# Patient Record
Sex: MALE | Race: WHITE | NOT HISPANIC OR LATINO | URBAN - METROPOLITAN AREA
[De-identification: names, ages, dates, MRNs, and addresses within clinical notes are randomized per-mention and may not be internally consistent; named-entity substitution may affect disease eponyms.]

---

## 2020-06-17 ENCOUNTER — EMERGENCY (EMERGENCY)
Facility: HOSPITAL | Age: 65
LOS: 1 days | Discharge: HOME | End: 2020-06-17
Attending: EMERGENCY MEDICINE
Payer: MEDICARE

## 2020-06-17 VITALS
DIASTOLIC BLOOD PRESSURE: 75 MMHG | RESPIRATION RATE: 20 BRPM | OXYGEN SATURATION: 99 % | SYSTOLIC BLOOD PRESSURE: 133 MMHG | HEART RATE: 107 BPM | TEMPERATURE: 98 F

## 2020-06-17 VITALS
DIASTOLIC BLOOD PRESSURE: 85 MMHG | HEART RATE: 98 BPM | OXYGEN SATURATION: 100 % | HEIGHT: 69 IN | TEMPERATURE: 98 F | RESPIRATION RATE: 18 BRPM | SYSTOLIC BLOOD PRESSURE: 166 MMHG | WEIGHT: 149.91 LBS

## 2020-06-17 DIAGNOSIS — Z90.49 ACQUIRED ABSENCE OF OTHER SPECIFIED PARTS OF DIGESTIVE TRACT: Chronic | ICD-10-CM

## 2020-06-17 LAB
ALBUMIN SERPL ELPH-MCNC: 3.9 G/DL — SIGNIFICANT CHANGE UP (ref 3.3–5.2)
ALP SERPL-CCNC: 79 U/L — SIGNIFICANT CHANGE UP (ref 40–120)
ALT FLD-CCNC: 20 U/L — SIGNIFICANT CHANGE UP
ANION GAP SERPL CALC-SCNC: 12 MMOL/L — SIGNIFICANT CHANGE UP (ref 5–17)
APTT BLD: 32.7 SEC — SIGNIFICANT CHANGE UP (ref 27.5–36.3)
AST SERPL-CCNC: 54 U/L — HIGH
BASOPHILS # BLD AUTO: 0.11 K/UL — SIGNIFICANT CHANGE UP (ref 0–0.2)
BASOPHILS NFR BLD AUTO: 0.9 % — SIGNIFICANT CHANGE UP (ref 0–2)
BILIRUB SERPL-MCNC: 0.3 MG/DL — LOW (ref 0.4–2)
BUN SERPL-MCNC: 11 MG/DL — SIGNIFICANT CHANGE UP (ref 8–20)
CALCIUM SERPL-MCNC: 10 MG/DL — SIGNIFICANT CHANGE UP (ref 8.6–10.2)
CHLORIDE SERPL-SCNC: 93 MMOL/L — LOW (ref 98–107)
CO2 SERPL-SCNC: 25 MMOL/L — SIGNIFICANT CHANGE UP (ref 22–29)
CREAT SERPL-MCNC: 0.94 MG/DL — SIGNIFICANT CHANGE UP (ref 0.5–1.3)
D DIMER BLD IA.RAPID-MCNC: <150 NG/ML DDU — SIGNIFICANT CHANGE UP
EOSINOPHIL # BLD AUTO: 0.39 K/UL — SIGNIFICANT CHANGE UP (ref 0–0.5)
EOSINOPHIL NFR BLD AUTO: 3.3 % — SIGNIFICANT CHANGE UP (ref 0–6)
GLUCOSE BLDC GLUCOMTR-MCNC: 133 MG/DL — HIGH (ref 70–99)
GLUCOSE BLDC GLUCOMTR-MCNC: 169 MG/DL — HIGH (ref 70–99)
GLUCOSE SERPL-MCNC: 184 MG/DL — HIGH (ref 70–99)
HCT VFR BLD CALC: 41.8 % — SIGNIFICANT CHANGE UP (ref 39–50)
HCT VFR BLD CALC: 44.6 % — SIGNIFICANT CHANGE UP (ref 39–50)
HGB BLD-MCNC: 14.2 G/DL — SIGNIFICANT CHANGE UP (ref 13–17)
HGB BLD-MCNC: 14.9 G/DL — SIGNIFICANT CHANGE UP (ref 13–17)
IMM GRANULOCYTES NFR BLD AUTO: 0.3 % — SIGNIFICANT CHANGE UP (ref 0–1.5)
INR BLD: 0.95 RATIO — SIGNIFICANT CHANGE UP (ref 0.88–1.16)
LYMPHOCYTES # BLD AUTO: 19.6 % — SIGNIFICANT CHANGE UP (ref 13–44)
LYMPHOCYTES # BLD AUTO: 2.32 K/UL — SIGNIFICANT CHANGE UP (ref 1–3.3)
MCHC RBC-ENTMCNC: 28.6 PG — SIGNIFICANT CHANGE UP (ref 27–34)
MCHC RBC-ENTMCNC: 28.7 PG — SIGNIFICANT CHANGE UP (ref 27–34)
MCHC RBC-ENTMCNC: 33.4 GM/DL — SIGNIFICANT CHANGE UP (ref 32–36)
MCHC RBC-ENTMCNC: 34 GM/DL — SIGNIFICANT CHANGE UP (ref 32–36)
MCV RBC AUTO: 84.3 FL — SIGNIFICANT CHANGE UP (ref 80–100)
MCV RBC AUTO: 85.8 FL — SIGNIFICANT CHANGE UP (ref 80–100)
MONOCYTES # BLD AUTO: 0.81 K/UL — SIGNIFICANT CHANGE UP (ref 0–0.9)
MONOCYTES NFR BLD AUTO: 6.9 % — SIGNIFICANT CHANGE UP (ref 2–14)
NEUTROPHILS # BLD AUTO: 8.15 K/UL — HIGH (ref 1.8–7.4)
NEUTROPHILS NFR BLD AUTO: 69 % — SIGNIFICANT CHANGE UP (ref 43–77)
PLATELET # BLD AUTO: 234 K/UL — SIGNIFICANT CHANGE UP (ref 150–400)
PLATELET # BLD AUTO: 253 K/UL — SIGNIFICANT CHANGE UP (ref 150–400)
POTASSIUM SERPL-MCNC: 4.3 MMOL/L — SIGNIFICANT CHANGE UP (ref 3.5–5.3)
POTASSIUM SERPL-SCNC: 4.3 MMOL/L — SIGNIFICANT CHANGE UP (ref 3.5–5.3)
PROT SERPL-MCNC: 7.3 G/DL — SIGNIFICANT CHANGE UP (ref 6.6–8.7)
PROTHROM AB SERPL-ACNC: 10.7 SEC — SIGNIFICANT CHANGE UP (ref 10–12.9)
RBC # BLD: 4.96 M/UL — SIGNIFICANT CHANGE UP (ref 4.2–5.8)
RBC # BLD: 5.2 M/UL — SIGNIFICANT CHANGE UP (ref 4.2–5.8)
RBC # FLD: 12.3 % — SIGNIFICANT CHANGE UP (ref 10.3–14.5)
RBC # FLD: 12.5 % — SIGNIFICANT CHANGE UP (ref 10.3–14.5)
SARS-COV-2 RNA SPEC QL NAA+PROBE: SIGNIFICANT CHANGE UP
SODIUM SERPL-SCNC: 130 MMOL/L — LOW (ref 135–145)
T3 SERPL-MCNC: 97 NG/DL — SIGNIFICANT CHANGE UP (ref 80–200)
T4 AB SER-ACNC: 7.1 UG/DL — SIGNIFICANT CHANGE UP (ref 4.5–12)
TROPONIN T SERPL-MCNC: <0.01 NG/ML — SIGNIFICANT CHANGE UP (ref 0–0.06)
TSH SERPL-MCNC: 2.31 UIU/ML — SIGNIFICANT CHANGE UP (ref 0.27–4.2)
WBC # BLD: 10.36 K/UL — SIGNIFICANT CHANGE UP (ref 3.8–10.5)
WBC # BLD: 11.82 K/UL — HIGH (ref 3.8–10.5)
WBC # FLD AUTO: 10.36 K/UL — SIGNIFICANT CHANGE UP (ref 3.8–10.5)
WBC # FLD AUTO: 11.82 K/UL — HIGH (ref 3.8–10.5)

## 2020-06-17 PROCEDURE — 36415 COLL VENOUS BLD VENIPUNCTURE: CPT

## 2020-06-17 PROCEDURE — 85730 THROMBOPLASTIN TIME PARTIAL: CPT

## 2020-06-17 PROCEDURE — 93880 EXTRACRANIAL BILAT STUDY: CPT | Mod: 26

## 2020-06-17 PROCEDURE — 85610 PROTHROMBIN TIME: CPT

## 2020-06-17 PROCEDURE — U0003: CPT

## 2020-06-17 PROCEDURE — 85379 FIBRIN DEGRADATION QUANT: CPT

## 2020-06-17 PROCEDURE — 80053 COMPREHEN METABOLIC PANEL: CPT

## 2020-06-17 PROCEDURE — G0378: CPT

## 2020-06-17 PROCEDURE — 84443 ASSAY THYROID STIM HORMONE: CPT

## 2020-06-17 PROCEDURE — 93010 ELECTROCARDIOGRAM REPORT: CPT | Mod: 76

## 2020-06-17 PROCEDURE — 71045 X-RAY EXAM CHEST 1 VIEW: CPT

## 2020-06-17 PROCEDURE — 71045 X-RAY EXAM CHEST 1 VIEW: CPT | Mod: 26

## 2020-06-17 PROCEDURE — 82962 GLUCOSE BLOOD TEST: CPT

## 2020-06-17 PROCEDURE — 99234 HOSP IP/OBS SM DT SF/LOW 45: CPT | Mod: CS

## 2020-06-17 PROCEDURE — 84484 ASSAY OF TROPONIN QUANT: CPT

## 2020-06-17 PROCEDURE — 93005 ELECTROCARDIOGRAM TRACING: CPT

## 2020-06-17 PROCEDURE — 93880 EXTRACRANIAL BILAT STUDY: CPT

## 2020-06-17 PROCEDURE — 84480 ASSAY TRIIODOTHYRONINE (T3): CPT

## 2020-06-17 PROCEDURE — 84436 ASSAY OF TOTAL THYROXINE: CPT

## 2020-06-17 PROCEDURE — 85027 COMPLETE CBC AUTOMATED: CPT

## 2020-06-17 PROCEDURE — 99284 EMERGENCY DEPT VISIT MOD MDM: CPT | Mod: 25

## 2020-06-17 RX ORDER — DEXTROSE 50 % IN WATER 50 %
25 SYRINGE (ML) INTRAVENOUS ONCE
Refills: 0 | Status: DISCONTINUED | OUTPATIENT
Start: 2020-06-17 | End: 2020-06-22

## 2020-06-17 RX ORDER — GLUCAGON INJECTION, SOLUTION 0.5 MG/.1ML
1 INJECTION, SOLUTION SUBCUTANEOUS ONCE
Refills: 0 | Status: DISCONTINUED | OUTPATIENT
Start: 2020-06-17 | End: 2020-06-22

## 2020-06-17 RX ORDER — SODIUM CHLORIDE 9 MG/ML
1000 INJECTION, SOLUTION INTRAVENOUS
Refills: 0 | Status: DISCONTINUED | OUTPATIENT
Start: 2020-06-17 | End: 2020-06-22

## 2020-06-17 RX ORDER — DEXTROSE 50 % IN WATER 50 %
15 SYRINGE (ML) INTRAVENOUS ONCE
Refills: 0 | Status: DISCONTINUED | OUTPATIENT
Start: 2020-06-17 | End: 2020-06-22

## 2020-06-17 RX ORDER — ASPIRIN/CALCIUM CARB/MAGNESIUM 324 MG
81 TABLET ORAL DAILY
Refills: 0 | Status: DISCONTINUED | OUTPATIENT
Start: 2020-06-17 | End: 2020-06-22

## 2020-06-17 RX ORDER — DEXTROSE 50 % IN WATER 50 %
12.5 SYRINGE (ML) INTRAVENOUS ONCE
Refills: 0 | Status: DISCONTINUED | OUTPATIENT
Start: 2020-06-17 | End: 2020-06-22

## 2020-06-17 RX ORDER — METOPROLOL TARTRATE 50 MG
100 TABLET ORAL DAILY
Refills: 0 | Status: DISCONTINUED | OUTPATIENT
Start: 2020-06-17 | End: 2020-06-22

## 2020-06-17 RX ORDER — INSULIN LISPRO 100/ML
VIAL (ML) SUBCUTANEOUS
Refills: 0 | Status: DISCONTINUED | OUTPATIENT
Start: 2020-06-17 | End: 2020-06-22

## 2020-06-17 RX ADMIN — Medication 2: at 08:05

## 2020-06-17 RX ADMIN — Medication 100 MILLIGRAM(S): at 08:05

## 2020-06-17 NOTE — ED ADULT NURSE NOTE - CHPI ED NUR SYMPTOMS NEG
no fever/no loss of consciousness/no weakness/no nausea/no numbness/no vomiting/no blurred vision/no change in level of consciousness/no confusion

## 2020-06-17 NOTE — ED PROVIDER NOTE - OBJECTIVE STATEMENT
pt is a 65 y/o male with a pmhx of HTN, DM, hx of SVT s/p ablation 4 years ago, currently following with [facility] presenting to the ed for near syncope. pt states he got up to go to his kitchen when he started feeling diaphoretic and light headed. pt thought his sugar was low, but when he checked it was not. pt denies chest pain palpitations or sob. pt denies recent sick contacts. pt states last stress test one year ago normal, no hx of cardiac stents. pt non smoker. pt denies calf pain or leg swelling, feveres, abd pain, nausea, vomiting, back pain, urinary or fecal incontience, head injury, headache, visual changes

## 2020-06-17 NOTE — ED CDU PROVIDER DISPOSITION NOTE - NSFOLLOWUPINSTRUCTIONS_ED_ALL_ED_FT
Syncope    Syncope is when you temporarily lose consciousness, also called fainting or passing out. It is caused by a sudden decrease in blood flow to the brain. Even though most causes of syncope are not dangerous, syncope can possibly be a sign of a serious medical problem. Signs that you may be about to faint include feeling dizzy, lightheaded, nausea, visual changes, or cold/clammy skin. Do not drive, operate heavy machinery, or play sports until your health care provider says it is okay.    SEEK IMMEDIATE MEDICAL CARE IF YOU HAVE ANY OF THE FOLLOWING SYMPTOMS: severe headache, pain in your chest/abdomen/back, bleeding from your mouth or rectum, palpitations, shortness of breath, pain with breathing, seizure, confusion, or trouble walking.     Please follow up with cardiology within four days  Please follow up with your doctor within 48 hours

## 2020-06-17 NOTE — CONSULT NOTE ADULT - SUBJECTIVE AND OBJECTIVE BOX
[facility]                                          [address], [address], [place] [address]                                               PHONE: [phone]    FAX: [phone] [address], [address], [place] [address]                                       PHONE: [phone]    FAX: [phone]  *******************************************************************************    Reason for Consult/CC: Near syncope    HPI:  ENOC PEARCE is a 64y man with HTN, DMII, HLD, nonobstructive CAD documented from remote cath, paroxsymal SVT s/p ablation, who presents to the ER for evaluation of near syncope. The patient states that he has been feeling well until last night when he states while standing in his kitchen he became diaphoretic and dizzy.  He though it might have been "low blood sugar" but notes that his finger stick at that time was 130.  He denies any associated chest pain, palpitations, shortness of breath or difficulty breathing.  He states that he has been very active lately mowing lawn and doing lots of yard work.  He currently states that he is feeling well and would like to go home.     PAST MEDICAL & SURGICAL HISTORY:  Diabetes  Hypertension  SVT (supraventricular tachycardia): had ablation 2008  S/P cholecystectomy      No Known Allergies      MEDICATIONS  (STANDING):  aspirin  chewable 81 milliGRAM(s) Oral daily  dextrose 5%. 1000 milliLiter(s) (50 mL/Hr) IV Continuous <Continuous>  dextrose 50% Injectable 12.5 Gram(s) IV Push once  dextrose 50% Injectable 25 Gram(s) IV Push once  dextrose 50% Injectable 25 Gram(s) IV Push once  insulin lispro (HumaLOG) corrective regimen sliding scale   SubCutaneous three times a day before meals  metoprolol succinate  milliGRAM(s) Oral daily    MEDICATIONS  (PRN):  dextrose 40% Gel 15 Gram(s) Oral once PRN Blood Glucose LESS THAN 70 milliGRAM(s)/deciliter  glucagon  Injectable 1 milliGRAM(s) IntraMuscular once PRN Glucose LESS THAN 70 milligrams/deciliter      Social History: no active tobacco / EtOH / IVDA    Family History:     ROS: As noted above, otherwise unremarkable.    Vital Signs Last 24 Hrs  T(C): 36.8 (17 Jun 2020 11:58), Max: 36.9 (17 Jun 2020 07:44)  T(F): 98.3 (17 Jun 2020 11:58), Max: 98.4 (17 Jun 2020 07:44)  HR: 107 (17 Jun 2020 11:58) (98 - 114)  BP: 133/75 (17 Jun 2020 11:58) (133/75 - 166/85)  RR: 20 (17 Jun 2020 11:58) (18 - 20)  SpO2: 99% (17 Jun 2020 11:58) (98% - 100%)    PHYSICAL EXAM:  General: Appears well developed, well nourished, no acute distress  HEENT: Head: normocephalic, atraumatic  Eyes: Pupils equal and reactive  Neck: Supple, no carotid bruit, no JVD, no HJR  CARDIOVASCULAR: Normal S1 and S2, no murmur, rub, or gallop  LUNGS: Clear to auscultation bilaterally, no rales, rhonchi or wheeze  ABDOMEN: Soft, nontender, non-distended, positive bowel sounds, no mass or bruit  EXTREMITIES: No edema, distal pulses WNL  SKIN: Warm and dry with normal turgor  NEURO: Alert & oriented x 3, grossly intact  PSYCH: normal mood and affect    LABS:                        14.2   10.36 )-----------( 234      ( 17 Jun 2020 09:37 )             41.8     06-17    130<L>  |  93<L>  |  11.0  ----------------------------<  184<H>  4.3   |  25.0  |  0.94    Ca    10.0      17 Jun 2020 01:42    TPro  7.3  /  Alb  3.9  /  TBili  0.3<L>  /  DBili  x   /  AST  54<H>  /  ALT  20  /  AlkPhos  79  06-17    CARDIAC MARKERS ( 17 Jun 2020 08:16 )  x     / <0.01 ng/mL / x     / x     / x      CARDIAC MARKERS ( 17 Jun 2020 05:22 )  x     / <0.01 ng/mL / x     / x     / x      CARDIAC MARKERS ( 17 Jun 2020 01:42 )  x     / <0.01 ng/mL / x     / x     / x          PT/INR - ( 17 Jun 2020 01:42 )   PT: 10.7 sec;   INR: 0.95 ratio         PTT - ( 17 Jun 2020 01:42 )  PTT:32.7 sec  Thyroid Stimulating Hormone, Serum: 2.31 uIU/mL (06-17 @ 09:37)      RADIOLOGY & ADDITIONAL STUDIES REVIEWED:    Assessment and Plan:  In summary, ENOC PEARCE is a 64y man with HTN, DMII, HLD, nonobstructive CAD documented from remote cath, paroxsymal SVT s/p ablation, admitted for evaluation of near syncope.    - Telemetry reviewed by me: all sinus/sinus tachycardia.   - The patient has been chest pain free since admission without ischemic ECG abnormalities and negative serial troponin and has ruled out for acute MI.   - Echocardiogram from 10/2018 reviewed: normal LV size and systolic function; no hemodynamically significant valvular lesions.   - No evidence of ischemia or CHF clinically, eventual ischemic evaluation (likely as outpatient)  - Rhythm/hemodynamics stable = continue current doses for now and titrate PRN  - Resting tachycardia noted. TSH and D-dimer both wnl.  Prior Holter monitor showing average HR of 95/min.  Continue current dose of metoprolol and encourage adequate hydration.   - Keep K > 4, Mg > 2  - No further inpatient cardiovascular work up indicated.    - Will follow up as needed.  Please do not hesitate to call with any questions or concerns.   - Discussed with ER staff.     Thank you for allowing me to participate in the care of your patient.      Sincerely,    German Batres M.D.

## 2020-06-17 NOTE — ED CDU PROVIDER INITIAL DAY NOTE - ATTENDING CONTRIBUTION TO CARE
65yo male with pmh of SVT s/p ablation, HTN and DM presents with near syncope. PT was going to kitchen felt diaphoretic and lightheaded, no loc. pt at baseline here. followed by [facility], labs wnl, no acute ischemic changes on ekg, will keep in obs for cards/syncope eval

## 2020-06-17 NOTE — ED CDU PROVIDER DISPOSITION NOTE - PATIENT PORTAL LINK FT
You can access the FollowMyHealth Patient Portal offered by [facility] by registering at the following website: [email or web address]. By joining [email or web address]’s FollowMyHealth portal, you will also be able to view your health information using other applications (apps) compatible with our system.

## 2020-06-17 NOTE — ED ADULT NURSE REASSESSMENT NOTE - NS ED NURSE REASSESS COMMENT FT1
Assumed care at 0730, at this time pt A&Ox4.  At this time pt is laying in bed, no complaints of pain or distress at this time.  Medication given as per EMAR.  pt educated on plan of care, pt able to successfully teach back plan of care to RN, RN will continue to reeducate pt during hospital stay.

## 2020-06-17 NOTE — ED CDU PROVIDER DISPOSITION NOTE - CLINICAL COURSE
63 y/o male placed in observation for near- syncope evaluation. serial trop negative. monitored on telemetry without any events. d-dimer < 150. carotid doppler results noted. PT evaluated by cardio and can d/c with outpatient follow up. ED return precautions discussed.

## 2020-06-17 NOTE — ED CDU PROVIDER DISPOSITION NOTE - ATTENDING CONTRIBUTION TO CARE
Pt. awake and alert. Pt. stable for discharge. I, Dr. Mccracken, performed a face to face bedside interview with this patient regarding history of present illness, review of symptoms and relevant past medical, social and family history.  I completed an independent physical examination.  I have also reviewed the ACP's note(s) and discussed the plan with the ACP.

## 2020-06-17 NOTE — ED PROVIDER NOTE - ATTENDING CONTRIBUTION TO CARE
65yo male with pmh of SVT s/p ablation, HTN and DM presents with near syncope. PT was going to kitchen felt diaphoretic and lightheaded, no loc. pt at baseline here. followed by [facility], labs wnl, no acute ischemic changes on ekg, will keep in obs for cards/syncope eval

## 2020-06-17 NOTE — ED ADULT TRIAGE NOTE - CHIEF COMPLAINT QUOTE
Pt comes in complaining of near-syncopal episode, "thought I was going to pass out, I was very sweaty, I thought my blood sugar was low." Pt A+Ox4, denies any chest pain or shortness of breath. Pt moved into critical care for EKG/FS.

## 2020-06-17 NOTE — ED CDU PROVIDER INITIAL DAY NOTE - OBJECTIVE STATEMENT
pt is a 65 y/o male with a pmhx of HTN, DM, hx of SVT s/p ablation 4 years ago, currently following with [facility] presenting to the ed for near syncope. pt states he got up to go to his kitchen when he started feeling diaphoretic and light headed. pt thought his sugar was low, but when he checked it was not. pt denies chest pain palpitations or sob. pt denies recent sick contacts. pt states last stress test one year ago normal, no hx of cardiac stents. pt non smoker. pt denies calf pain or leg swelling, fevers, abd pain, nausea, vomiting, back pain, urinary or fecal incontience, head injury, headache, visual changes

## 2020-06-17 NOTE — ED CDU PROVIDER DISPOSITION NOTE - CARE PROVIDER_API CALL
German Batres)  Cardiovascular Disease; Internal Medicine  [address] [place] [address]  Phone: [phone]  Fax: [phone]  Follow Up Time:

## 2020-06-17 NOTE — ED ADULT NURSE NOTE - NSIMPLEMENTINTERV_GEN_ALL_ED
Implemented All Universal Safety Interventions:  [place] to call system. Call bell, personal items and telephone within reach. Instruct patient to call for assistance. Room bathroom lighting operational. Non-slip footwear when patient is off stretcher. Physically safe environment: no spills, clutter or unnecessary equipment. Stretcher in lowest position, wheels locked, appropriate side rails in place.

## 2020-06-17 NOTE — ED PROVIDER NOTE - NEUROLOGICAL, MLM
Alert and oriented, CN II-XII intact, neurovasculary intact, muscle strength fair, gait without ataxia

## 2022-11-17 ENCOUNTER — EMERGENCY (EMERGENCY)
Facility: HOSPITAL | Age: 67
LOS: 1 days | Discharge: HOME | End: 2022-11-17
Attending: EMERGENCY MEDICINE
Payer: MEDICARE

## 2022-11-17 VITALS
RESPIRATION RATE: 16 BRPM | SYSTOLIC BLOOD PRESSURE: 157 MMHG | OXYGEN SATURATION: 100 % | TEMPERATURE: 98 F | DIASTOLIC BLOOD PRESSURE: 84 MMHG | HEART RATE: 92 BPM | HEIGHT: 69 IN | WEIGHT: 147.93 LBS

## 2022-11-17 DIAGNOSIS — Z90.49 ACQUIRED ABSENCE OF OTHER SPECIFIED PARTS OF DIGESTIVE TRACT: Chronic | ICD-10-CM

## 2022-11-17 LAB
ALBUMIN SERPL ELPH-MCNC: 3.7 G/DL — SIGNIFICANT CHANGE UP (ref 3.3–5.2)
ALP SERPL-CCNC: 77 U/L — SIGNIFICANT CHANGE UP (ref 40–120)
ALT FLD-CCNC: 18 U/L — SIGNIFICANT CHANGE UP
ANION GAP SERPL CALC-SCNC: 8 MMOL/L — SIGNIFICANT CHANGE UP (ref 5–17)
APTT BLD: 28.8 SEC — SIGNIFICANT CHANGE UP (ref 27.5–35.5)
AST SERPL-CCNC: 52 U/L — HIGH
BILIRUB SERPL-MCNC: 0.9 MG/DL — SIGNIFICANT CHANGE UP (ref 0.4–2)
BUN SERPL-MCNC: 11.2 MG/DL — SIGNIFICANT CHANGE UP (ref 8–20)
CALCIUM SERPL-MCNC: 9.5 MG/DL — SIGNIFICANT CHANGE UP (ref 8.4–10.5)
CHLORIDE SERPL-SCNC: 92 MMOL/L — LOW (ref 96–108)
CO2 SERPL-SCNC: 25 MMOL/L — SIGNIFICANT CHANGE UP (ref 22–29)
CREAT SERPL-MCNC: 0.89 MG/DL — SIGNIFICANT CHANGE UP (ref 0.5–1.3)
EGFR: 93 ML/MIN/1.73M2 — SIGNIFICANT CHANGE UP
GLUCOSE SERPL-MCNC: 220 MG/DL — HIGH (ref 70–99)
HCT VFR BLD CALC: 41.4 % — SIGNIFICANT CHANGE UP (ref 39–50)
HGB BLD-MCNC: 14.5 G/DL — SIGNIFICANT CHANGE UP (ref 13–17)
INR BLD: 1.07 RATIO — SIGNIFICANT CHANGE UP (ref 0.88–1.16)
MCHC RBC-ENTMCNC: 29.7 PG — SIGNIFICANT CHANGE UP (ref 27–34)
MCHC RBC-ENTMCNC: 35 GM/DL — SIGNIFICANT CHANGE UP (ref 32–36)
MCV RBC AUTO: 84.8 FL — SIGNIFICANT CHANGE UP (ref 80–100)
PLATELET # BLD AUTO: 164 K/UL — SIGNIFICANT CHANGE UP (ref 150–400)
POTASSIUM SERPL-MCNC: 4.7 MMOL/L — SIGNIFICANT CHANGE UP (ref 3.5–5.3)
POTASSIUM SERPL-SCNC: 4.7 MMOL/L — SIGNIFICANT CHANGE UP (ref 3.5–5.3)
PROT SERPL-MCNC: 6.3 G/DL — LOW (ref 6.6–8.7)
PROTHROM AB SERPL-ACNC: 12.4 SEC — SIGNIFICANT CHANGE UP (ref 10.5–13.4)
RBC # BLD: 4.88 M/UL — SIGNIFICANT CHANGE UP (ref 4.2–5.8)
RBC # FLD: 12.8 % — SIGNIFICANT CHANGE UP (ref 10.3–14.5)
SODIUM SERPL-SCNC: 125 MMOL/L — LOW (ref 135–145)
TROPONIN T SERPL-MCNC: <0.01 NG/ML — SIGNIFICANT CHANGE UP (ref 0–0.06)
WBC # BLD: 8.49 K/UL — SIGNIFICANT CHANGE UP (ref 3.8–10.5)
WBC # FLD AUTO: 8.49 K/UL — SIGNIFICANT CHANGE UP (ref 3.8–10.5)

## 2022-11-17 PROCEDURE — 93010 ELECTROCARDIOGRAM REPORT: CPT

## 2022-11-17 PROCEDURE — 99285 EMERGENCY DEPT VISIT HI MDM: CPT

## 2022-11-17 RX ORDER — SODIUM CHLORIDE 9 MG/ML
1000 INJECTION INTRAMUSCULAR; INTRAVENOUS; SUBCUTANEOUS ONCE
Refills: 0 | Status: COMPLETED | OUTPATIENT
Start: 2022-11-17 | End: 2022-11-17

## 2022-11-17 NOTE — ED ADULT TRIAGE NOTE - CHIEF COMPLAINT QUOTE
Pt A&Ox4 states "I passed out going into bed from the bathroom." BIBA c/o Syncopal episode after BM, felt dizzy and sweaty. Patient has colon cancer completed 1st infusion of chemo today, denies any pain or injury.

## 2022-11-18 LAB
ANION GAP SERPL CALC-SCNC: 12 MMOL/L — SIGNIFICANT CHANGE UP (ref 5–17)
ANION GAP SERPL CALC-SCNC: 9 MMOL/L — SIGNIFICANT CHANGE UP (ref 5–17)
ANION GAP SERPL CALC-SCNC: 9 MMOL/L — SIGNIFICANT CHANGE UP (ref 5–17)
APPEARANCE UR: CLEAR — SIGNIFICANT CHANGE UP
BILIRUB UR-MCNC: NEGATIVE — SIGNIFICANT CHANGE UP
BUN SERPL-MCNC: 10 MG/DL — SIGNIFICANT CHANGE UP (ref 8–20)
BUN SERPL-MCNC: 8.7 MG/DL — SIGNIFICANT CHANGE UP (ref 8–20)
BUN SERPL-MCNC: 9 MG/DL — SIGNIFICANT CHANGE UP (ref 8–20)
CALCIUM SERPL-MCNC: 8.5 MG/DL — SIGNIFICANT CHANGE UP (ref 8.4–10.5)
CALCIUM SERPL-MCNC: 8.7 MG/DL — SIGNIFICANT CHANGE UP (ref 8.4–10.5)
CALCIUM SERPL-MCNC: 8.8 MG/DL — SIGNIFICANT CHANGE UP (ref 8.4–10.5)
CHLORIDE SERPL-SCNC: 92 MMOL/L — LOW (ref 96–108)
CHLORIDE SERPL-SCNC: 96 MMOL/L — SIGNIFICANT CHANGE UP (ref 96–108)
CHLORIDE SERPL-SCNC: 97 MMOL/L — SIGNIFICANT CHANGE UP (ref 96–108)
CO2 SERPL-SCNC: 22 MMOL/L — SIGNIFICANT CHANGE UP (ref 22–29)
CO2 SERPL-SCNC: 23 MMOL/L — SIGNIFICANT CHANGE UP (ref 22–29)
CO2 SERPL-SCNC: 23 MMOL/L — SIGNIFICANT CHANGE UP (ref 22–29)
COLOR SPEC: SIGNIFICANT CHANGE UP
CREAT SERPL-MCNC: 0.66 MG/DL — SIGNIFICANT CHANGE UP (ref 0.5–1.3)
CREAT SERPL-MCNC: 0.68 MG/DL — SIGNIFICANT CHANGE UP (ref 0.5–1.3)
CREAT SERPL-MCNC: 0.74 MG/DL — SIGNIFICANT CHANGE UP (ref 0.5–1.3)
DIFF PNL FLD: NEGATIVE — SIGNIFICANT CHANGE UP
EGFR: 101 ML/MIN/1.73M2 — SIGNIFICANT CHANGE UP
EGFR: 102 ML/MIN/1.73M2 — SIGNIFICANT CHANGE UP
EGFR: 98 ML/MIN/1.73M2 — SIGNIFICANT CHANGE UP
FLUAV AG NPH QL: SIGNIFICANT CHANGE UP
FLUBV AG NPH QL: SIGNIFICANT CHANGE UP
GLUCOSE BLDC GLUCOMTR-MCNC: 142 MG/DL — HIGH (ref 70–99)
GLUCOSE BLDC GLUCOMTR-MCNC: 161 MG/DL — HIGH (ref 70–99)
GLUCOSE BLDC GLUCOMTR-MCNC: 169 MG/DL — HIGH (ref 70–99)
GLUCOSE BLDC GLUCOMTR-MCNC: 191 MG/DL — HIGH (ref 70–99)
GLUCOSE SERPL-MCNC: 195 MG/DL — HIGH (ref 70–99)
GLUCOSE SERPL-MCNC: 197 MG/DL — HIGH (ref 70–99)
GLUCOSE SERPL-MCNC: 199 MG/DL — HIGH (ref 70–99)
GLUCOSE UR QL: 250 MG/DL
HCT VFR BLD CALC: 40.3 % — SIGNIFICANT CHANGE UP (ref 39–50)
HGB BLD-MCNC: 13.9 G/DL — SIGNIFICANT CHANGE UP (ref 13–17)
KETONES UR-MCNC: ABNORMAL
LEUKOCYTE ESTERASE UR-ACNC: NEGATIVE — SIGNIFICANT CHANGE UP
MCHC RBC-ENTMCNC: 29.1 PG — SIGNIFICANT CHANGE UP (ref 27–34)
MCHC RBC-ENTMCNC: 34.5 GM/DL — SIGNIFICANT CHANGE UP (ref 32–36)
MCV RBC AUTO: 84.5 FL — SIGNIFICANT CHANGE UP (ref 80–100)
NITRITE UR-MCNC: NEGATIVE — SIGNIFICANT CHANGE UP
PH UR: 8 — SIGNIFICANT CHANGE UP (ref 5–8)
PLATELET # BLD AUTO: 149 K/UL — LOW (ref 150–400)
POTASSIUM SERPL-MCNC: 3.9 MMOL/L — SIGNIFICANT CHANGE UP (ref 3.5–5.3)
POTASSIUM SERPL-MCNC: 3.9 MMOL/L — SIGNIFICANT CHANGE UP (ref 3.5–5.3)
POTASSIUM SERPL-MCNC: 4.1 MMOL/L — SIGNIFICANT CHANGE UP (ref 3.5–5.3)
POTASSIUM SERPL-SCNC: 3.9 MMOL/L — SIGNIFICANT CHANGE UP (ref 3.5–5.3)
POTASSIUM SERPL-SCNC: 3.9 MMOL/L — SIGNIFICANT CHANGE UP (ref 3.5–5.3)
POTASSIUM SERPL-SCNC: 4.1 MMOL/L — SIGNIFICANT CHANGE UP (ref 3.5–5.3)
PROT UR-MCNC: NEGATIVE — SIGNIFICANT CHANGE UP
RBC # BLD: 4.77 M/UL — SIGNIFICANT CHANGE UP (ref 4.2–5.8)
RBC # FLD: 12.5 % — SIGNIFICANT CHANGE UP (ref 10.3–14.5)
RSV RNA NPH QL NAA+NON-PROBE: SIGNIFICANT CHANGE UP
SARS-COV-2 RNA SPEC QL NAA+PROBE: SIGNIFICANT CHANGE UP
SODIUM SERPL-SCNC: 127 MMOL/L — LOW (ref 135–145)
SODIUM SERPL-SCNC: 127 MMOL/L — LOW (ref 135–145)
SODIUM SERPL-SCNC: 128 MMOL/L — LOW (ref 135–145)
SP GR SPEC: 1.01 — SIGNIFICANT CHANGE UP (ref 1.01–1.02)
TROPONIN T SERPL-MCNC: <0.01 NG/ML — SIGNIFICANT CHANGE UP (ref 0–0.06)
UROBILINOGEN FLD QL: NEGATIVE MG/DL — SIGNIFICANT CHANGE UP
WBC # BLD: 9.89 K/UL — SIGNIFICANT CHANGE UP (ref 3.8–10.5)
WBC # FLD AUTO: 9.89 K/UL — SIGNIFICANT CHANGE UP (ref 3.8–10.5)

## 2022-11-18 PROCEDURE — 70450 CT HEAD/BRAIN W/O DYE: CPT | Mod: 26,MA

## 2022-11-18 PROCEDURE — 99220: CPT

## 2022-11-18 PROCEDURE — 93010 ELECTROCARDIOGRAM REPORT: CPT

## 2022-11-18 PROCEDURE — 71045 X-RAY EXAM CHEST 1 VIEW: CPT | Mod: 26

## 2022-11-18 RX ORDER — DEXTROSE 50 % IN WATER 50 %
15 SYRINGE (ML) INTRAVENOUS ONCE
Refills: 0 | Status: DISCONTINUED | OUTPATIENT
Start: 2022-11-18 | End: 2022-11-25

## 2022-11-18 RX ORDER — SODIUM CHLORIDE 9 MG/ML
1000 INJECTION INTRAMUSCULAR; INTRAVENOUS; SUBCUTANEOUS ONCE
Refills: 0 | Status: COMPLETED | OUTPATIENT
Start: 2022-11-18 | End: 2022-11-18

## 2022-11-18 RX ORDER — ACETAMINOPHEN 500 MG
1000 TABLET ORAL ONCE
Refills: 0 | Status: COMPLETED | OUTPATIENT
Start: 2022-11-18 | End: 2022-11-18

## 2022-11-18 RX ORDER — SODIUM CHLORIDE 9 MG/ML
1000 INJECTION INTRAMUSCULAR; INTRAVENOUS; SUBCUTANEOUS
Refills: 0 | Status: DISCONTINUED | OUTPATIENT
Start: 2022-11-18 | End: 2022-11-18

## 2022-11-18 RX ORDER — GLUCAGON INJECTION, SOLUTION 0.5 MG/.1ML
1 INJECTION, SOLUTION SUBCUTANEOUS ONCE
Refills: 0 | Status: DISCONTINUED | OUTPATIENT
Start: 2022-11-18 | End: 2022-11-25

## 2022-11-18 RX ORDER — INSULIN LISPRO 100/ML
VIAL (ML) SUBCUTANEOUS
Refills: 0 | Status: DISCONTINUED | OUTPATIENT
Start: 2022-11-18 | End: 2022-11-25

## 2022-11-18 RX ORDER — METOPROLOL TARTRATE 50 MG
100 TABLET ORAL DAILY
Refills: 0 | Status: DISCONTINUED | OUTPATIENT
Start: 2022-11-18 | End: 2022-11-25

## 2022-11-18 RX ORDER — LANOLIN ALCOHOL/MO/W.PET/CERES
3 CREAM (GRAM) TOPICAL ONCE
Refills: 0 | Status: COMPLETED | OUTPATIENT
Start: 2022-11-18 | End: 2022-11-18

## 2022-11-18 RX ORDER — SODIUM CHLORIDE 9 MG/ML
1000 INJECTION, SOLUTION INTRAVENOUS
Refills: 0 | Status: DISCONTINUED | OUTPATIENT
Start: 2022-11-18 | End: 2022-11-25

## 2022-11-18 RX ORDER — DEXTROSE 50 % IN WATER 50 %
25 SYRINGE (ML) INTRAVENOUS ONCE
Refills: 0 | Status: DISCONTINUED | OUTPATIENT
Start: 2022-11-18 | End: 2022-11-25

## 2022-11-18 RX ORDER — DEXTROSE 50 % IN WATER 50 %
12.5 SYRINGE (ML) INTRAVENOUS ONCE
Refills: 0 | Status: DISCONTINUED | OUTPATIENT
Start: 2022-11-18 | End: 2022-11-25

## 2022-11-18 RX ORDER — ACETAMINOPHEN 500 MG
650 TABLET ORAL EVERY 6 HOURS
Refills: 0 | Status: DISCONTINUED | OUTPATIENT
Start: 2022-11-18 | End: 2022-11-25

## 2022-11-18 RX ADMIN — Medication 2: at 08:33

## 2022-11-18 RX ADMIN — SODIUM CHLORIDE 1000 MILLILITER(S): 9 INJECTION INTRAMUSCULAR; INTRAVENOUS; SUBCUTANEOUS at 00:13

## 2022-11-18 RX ADMIN — SODIUM CHLORIDE 1000 MILLILITER(S): 9 INJECTION INTRAMUSCULAR; INTRAVENOUS; SUBCUTANEOUS at 19:07

## 2022-11-18 RX ADMIN — Medication 2: at 17:39

## 2022-11-18 RX ADMIN — SODIUM CHLORIDE 125 MILLILITER(S): 9 INJECTION INTRAMUSCULAR; INTRAVENOUS; SUBCUTANEOUS at 14:43

## 2022-11-18 RX ADMIN — SODIUM CHLORIDE 1000 MILLILITER(S): 9 INJECTION INTRAMUSCULAR; INTRAVENOUS; SUBCUTANEOUS at 03:31

## 2022-11-18 RX ADMIN — Medication 100 MILLIGRAM(S): at 06:44

## 2022-11-18 NOTE — ED CDU PROVIDER INITIAL DAY NOTE - PROGRESS NOTE DETAILS
syncopized secondary time while attempting orthostatic BPs   came to. no head injury.   will repeat am labs and ekg

## 2022-11-18 NOTE — ED CDU PROVIDER INITIAL DAY NOTE - OBJECTIVE STATEMENT
67-year-old male history of CAD hypertension IDDM recently diagnosed with colon cancer and just started chemotherapy with [facility] yesterday.  Presenting to the ED with episode of syncope.  Reports that he had a loose bowel movement, no melena no bRPR after receiving chemo. was getting up from the toilet felt " woozy" , proceeded to walk to his bedroom where he felt like "I was going down".  Positive head injury, no ac.  Patient reports mild diffuse abdominal discomfort, no associated nausea, vomiting, fevers.  Reports prior history of syncopal episodes.  Follows with [facility], no recent outpatient appointments.  Denied presyncopal symptoms, no chest pain shortness of breath, headache, numbness, tingling, visual changes.  + family hx of DM

## 2022-11-18 NOTE — ED PROVIDER NOTE - OBJECTIVE STATEMENT
68 yo male pmh dm , htn, rectal ca on chemotherapy comes to ed with syncopal episode after having a loose bowel movement; pt denies any head or neck pain

## 2022-11-18 NOTE — ED PROVIDER NOTE - PHYSICAL EXAMINATION
Alert, lucid, and in no apparent distress. Pt is normocephalic, atraumatic.  Pupils are equal, round, lips pink, moist mucous membranes, tongue midline. Neck supple.   Lungs clear to auscultation. Heart regular rate and rhythm, normal S1, S2.  Abdomen is soft, nontender, no pulsatile mass, no masses, no distension,   Non-focal sensory, 5 out of 5 motor strength, no dysmetria, fluent, goal directed speech. CN2 to 12 intact.   Normal mentation, does not appear agitated

## 2022-11-18 NOTE — ED CDU PROVIDER INITIAL DAY NOTE - MEDICAL DECISION MAKING DETAILS
68 yo male recent dx and start of chemo for colon cancer presenting with syncopal episode following loose bowel movement. ct head negative, trop negative.   placed in obs for syncope eval   - [facility] consultation   - orthostatics   - tele  - serial trops     pt persistently tachycardic, will check rectal temp.

## 2022-11-18 NOTE — ED CDU PROVIDER INITIAL DAY NOTE - PHYSICAL EXAMINATION
Gen: Well appearing in NAD  Head: NC/AT  Neck: trachea midline  Resp:  No distress CTA   CARD: tachycardic   ABD soft non distended + mild tenderness diffuse.   Ext: no deformities  Neuro:  A&O appears non focal  Skin:  Warm and dry as visualized  Psych:  Normal affect and mood

## 2022-11-18 NOTE — ED PROVIDER NOTE - CLINICAL SUMMARY MEDICAL DECISION MAKING FREE TEXT BOX
66 yo male pmh htn , dm with syncopal episode  eval neuro vs cardiac; consult cardiology ( Dr Roca of [facility])

## 2022-11-18 NOTE — ED ADULT NURSE NOTE - OBJECTIVE STATEMENT
Patient A&Ox4 states "I passed out going into bed from the bathroom." Patient c/o Syncopal episode after BM, felt dizzy and sweaty. Patient has colon cancer completed 1st infusion of chemo today, denies any pain or injury. IV placed, labs sent, medicated as per orders.

## 2022-11-18 NOTE — ED CDU PROVIDER INITIAL DAY NOTE - ATTENDING APP SHARED VISIT CONTRIBUTION OF CARE
67-year-old male history of CAD hypertension IDDM recently diagnosed with colon cancer with syncopal episode .+ loose stools ; pe awake alert heent ncat neck supple cor s1s2 abd soft nontender neuro nonfocal   dx syncope

## 2022-11-18 NOTE — ED CDU PROVIDER INITIAL DAY NOTE - NSICDXPASTMEDICALHX_GEN_ALL_CORE_FT
PAST MEDICAL HISTORY:  Diabetes     Hypertension     SVT (supraventricular tachycardia) had ablation 2008

## 2022-11-18 NOTE — ED ADULT NURSE NOTE - IS THE PATIENT ABLE TO BE SCREENED?
Yes scheduled for right knee arthroscopy with internal fixation of medial condyle osteochondritis desicans lesion on 8/1/18 with Dr. Rodríguez at [place].

## 2022-11-18 NOTE — ED ADULT NURSE NOTE - DRUG PRE-SCREENING (DAST -1)
SUGGEST A REGULAR TEXTURE DIET WITH THIN LIQUID CONSISTENCIES AS THE PATIENT APPEARED CLINICALLY TOLERANT OF THESE FOOD CONSISTENCIES FROM AN OROPHARYNGEAL SWALLOWING PERSPECTIVE ON CLINICAL EXAM.
Statement Selected

## 2022-11-19 VITALS
SYSTOLIC BLOOD PRESSURE: 144 MMHG | RESPIRATION RATE: 20 BRPM | TEMPERATURE: 98 F | OXYGEN SATURATION: 100 % | HEART RATE: 100 BPM | DIASTOLIC BLOOD PRESSURE: 90 MMHG

## 2022-11-19 LAB
GLUCOSE BLDC GLUCOMTR-MCNC: 167 MG/DL — HIGH (ref 70–99)
GLUCOSE BLDC GLUCOMTR-MCNC: 176 MG/DL — HIGH (ref 70–99)

## 2022-11-19 PROCEDURE — 80048 BASIC METABOLIC PNL TOTAL CA: CPT

## 2022-11-19 PROCEDURE — 70450 CT HEAD/BRAIN W/O DYE: CPT | Mod: MA

## 2022-11-19 PROCEDURE — 81003 URINALYSIS AUTO W/O SCOPE: CPT

## 2022-11-19 PROCEDURE — 71045 X-RAY EXAM CHEST 1 VIEW: CPT

## 2022-11-19 PROCEDURE — 96360 HYDRATION IV INFUSION INIT: CPT

## 2022-11-19 PROCEDURE — 36415 COLL VENOUS BLD VENIPUNCTURE: CPT

## 2022-11-19 PROCEDURE — 85027 COMPLETE CBC AUTOMATED: CPT

## 2022-11-19 PROCEDURE — 84484 ASSAY OF TROPONIN QUANT: CPT

## 2022-11-19 PROCEDURE — 93005 ELECTROCARDIOGRAM TRACING: CPT

## 2022-11-19 PROCEDURE — 85730 THROMBOPLASTIN TIME PARTIAL: CPT

## 2022-11-19 PROCEDURE — 80053 COMPREHEN METABOLIC PANEL: CPT

## 2022-11-19 PROCEDURE — 85610 PROTHROMBIN TIME: CPT

## 2022-11-19 PROCEDURE — 99222 1ST HOSP IP/OBS MODERATE 55: CPT

## 2022-11-19 PROCEDURE — 96361 HYDRATE IV INFUSION ADD-ON: CPT

## 2022-11-19 PROCEDURE — 82962 GLUCOSE BLOOD TEST: CPT

## 2022-11-19 PROCEDURE — 87637 SARSCOV2&INF A&B&RSV AMP PRB: CPT

## 2022-11-19 PROCEDURE — G0378: CPT

## 2022-11-19 PROCEDURE — 99217: CPT

## 2022-11-19 PROCEDURE — 99285 EMERGENCY DEPT VISIT HI MDM: CPT | Mod: 25

## 2022-11-19 RX ORDER — METOPROLOL TARTRATE 50 MG
1 TABLET ORAL
Qty: 30 | Refills: 0
Start: 2022-11-19 | End: 2022-12-18

## 2022-11-19 RX ADMIN — Medication 2: at 08:05

## 2022-11-19 RX ADMIN — Medication 2: at 11:51

## 2022-11-19 RX ADMIN — Medication 20 MILLILITER(S): at 09:35

## 2022-11-19 RX ADMIN — Medication 100 MILLIGRAM(S): at 06:03

## 2022-11-19 NOTE — ED CDU PROVIDER DISPOSITION NOTE - CARE PROVIDER_API CALL
Compa Roca)  Cardiology; Internal Medicine  [address]  [place] [address]  Phone: [phone]  Fax: [phone]  Follow Up Time:

## 2022-11-19 NOTE — ED CDU PROVIDER SUBSEQUENT DAY NOTE - PHYSICAL EXAMINATION
Gen: No acute distress, non toxic. Well appearing.   HEENT: Mucous membranes moist, pink conjunctivae, EOMI. PERRL. Airway patent.   CV: RRR, nl s1/s2. Tachycardic 102  Resp: CTAB, normal rate and effort. no wheezes, rhonchi or crackles.   GI: Abdomen soft, NT, ND. No rebound, no guarding  Neuro: A&O x4, MAEx4. 5/5 str ext x 4. Sensation intact, symmetric throughout. No FND's.   MSK: FROM UE and LE b/l  Skin: No rashes. intact and perfused. cap refill <2sec  Vascular: Radial and dorsalis pedal pulses 2+ b/l. No LE edema. No calf ttp or swelling.

## 2022-11-19 NOTE — ED CDU PROVIDER SUBSEQUENT DAY NOTE - PROGRESS NOTE DETAILS
pt feeling well, no recurrence of symptoms while in obs. likely orthostatic symptoms. pending cardiology c/s

## 2022-11-19 NOTE — ED CDU PROVIDER SUBSEQUENT DAY NOTE - MEDICAL DECISION MAKING DETAILS
66 yo male pmhx of CAD, HTN, recently diagnosed with colon CA and just started chemotherapy with [facility] 11/17, presented to ER on 11/18 with syncopal episode following loose BM. ct head negative, tropx2 negative. Pt had subsequent episode of syncope while orthostatics were being evaluated on 11/18, no head injury. Rpt orthostatics repeated 11/18, no syncopal episode. Pt feeling better, symptoms have improved. [facility] consulted, plan to see 11/19 in the AM.

## 2022-11-19 NOTE — ED CDU PROVIDER DISPOSITION NOTE - CLINICAL COURSE
66yo M PMHx of CAD, HTN, IDDM, recently diagnosed with colon cancer and just started chemotherapy with [facility] presented to ED for syncopal episode. CTH negative, CXR negative. labs unremarkable other than hyponatremia, chronic as per pt. Kept in observation for cardiology c/s. Cardiology recommending reducing dose of Metoprolol from 100mg daily to 50mg daily. Pt asymptomatic on re-assessment this morning. no recurrence of syncope. instructed to f/u Dr. Roca in office. return precautions discussed.

## 2022-11-19 NOTE — CONSULT NOTE ADULT - ASSESSMENT
66 y.o. man with hx of Moderate nonobstructive coronary artery disease with maximum 50% mid LAD lesion noted on distant cardiac catheterization, Supraventricular tachycardia, status post ablation in July 2008. Diabetes, Hypertension with white coat hypertension. Undergoing chemotherapy for rectal CA. Comes to the ED with episode of syncope.  Reports that he had a loose bowel movement, no melena no bRPR after receiving chemo. was getting up from the toilet felt " woozy" , proceeded to walk to his bedroom where he felt like "I was going down".  Positive head injury, no ac.  Patient reports mild diffuse abdominal discomfort, no associated nausea, vomiting, fevers, chest pain, palpitation, HF symptoms, no claudication. Cardiology consult requested for assessment of syncope.   Orthostatic VS done with ED consistent with orthostatic hypotension       Syncopal episode secondary to orthostatism (in the setting of recent chemo, dehydration)  cardiac workup so far is unremarkable   no additional cardiac testing indicated at this time   supportive care   reduce dose of home metoprolol (100mg bid to 50mg bid)  please call us back with questions or concerns.

## 2022-11-19 NOTE — CONSULT NOTE ADULT - SUBJECTIVE AND OBJECTIVE BOX
CHIEF COMPLAINT: syncope    HPI: 66 y.o. man with hx of Moderate nonobstructive coronary artery disease with maximum 50% mid LAD lesion noted on distant cardiac catheterization, Supraventricular tachycardia, status post ablation in July 2008. Diabetes, Hypertension with white coat hypertension. Undergoing chemotherapy for rectal CA. Comes to the ED with episode of syncope.  Reports that he had a loose bowel movement, no melena no bRPR after receiving chemo. was getting up from the toilet felt " woozy" , proceeded to walk to his bedroom where he felt like "I was going down".  Positive head injury, no ac.  Patient reports mild diffuse abdominal discomfort, no associated nausea, vomiting, fevers, chest pain, palpitation, HF symptoms, no claudication. Cardiology consult requested for assessment of syncope.       PAST MEDICAL & SURGICAL HISTORY:  SVT (supraventricular tachycardia)  had ablation 2008      Hypertension      Diabetes      S/P cholecystectomy          Allergies    No Known Allergies    Intolerances        MEDICATIONS  (STANDING):  dextrose 5%. 1000 milliLiter(s) (100 mL/Hr) IV Continuous <Continuous>  dextrose 5%. 1000 milliLiter(s) (50 mL/Hr) IV Continuous <Continuous>  dextrose 50% Injectable 25 Gram(s) IV Push once  dextrose 50% Injectable 12.5 Gram(s) IV Push once  dextrose 50% Injectable 25 Gram(s) IV Push once  glucagon  Injectable 1 milliGRAM(s) IntraMuscular once  insulin lispro (ADMELOG) corrective regimen sliding scale   SubCutaneous three times a day before meals  metoprolol succinate  milliGRAM(s) Oral daily    MEDICATIONS  (PRN):  acetaminophen     Tablet .. 650 milliGRAM(s) Oral every 6 hours PRN Temp greater or equal to 38C (100.4F), Moderate Pain (4 - 6)  dextrose Oral Gel 15 Gram(s) Oral once PRN Blood Glucose LESS THAN 70 milliGRAM(s)/deciliter      FAMILY HISTORY:  FH: diabetes mellitus        ***No family history of premature coronary artery disease or sudden cardiac death    SOCIAL HISTORY:  Smoking-  Alcohol-  Illicit Drug use-    REVIEW OF SYSTEMS:  Constitutional: [ ] fever, [ ]weight loss,  [ ]fatigue  Eyes: [ ] visual changes  Respiratory: [ ]shortness of breath;  [ ] cough, [ ]wheezing, [ ]chills, [ ]hemoptysis  Cardiovascular: [ ] chest pain, [ ]palpitations, [ ]dizziness,  [ ]leg swelling [ ]syncope  Gastrointestinal: [ ] abdominal pain, [ ]nausea, [ ]vomiting,  [ ]diarrhea   Genitourinary: [ ] dysuria, [ ] hematuria  Neurologic: [ ] headaches [ ] tremors  [ ] weakness [ ] lightheadedness  Skin: [ ] itching, [ ]burning, [ ] rashes  Endocrine: [ ] heat or cold intolerance  Musculoskeletal: [ ] joint pain or swelling; [ ] muscle, back, or extremity pain  Psychiatric: [ ] depression, [ ]anxiety, [ ]mood swings, or [ ]difficulty sleeping  Hematologic: [ ] easy bruising, [ ] bleeding gums     [ x] All others negative	  [ ] Unable to obtain    Vital Signs Last 24 Hrs  T(C): 37 (19 Nov 2022 10:59), Max: 37.1 (18 Nov 2022 18:19)  T(F): 98.6 (19 Nov 2022 10:59), Max: 98.8 (18 Nov 2022 18:19)  HR: 100 (19 Nov 2022 10:59) (93 - 100)  BP: 156/85 (19 Nov 2022 10:59) (149/88 - 181/93)  BP(mean): --  RR: 20 (19 Nov 2022 10:59) (18 - 20)  SpO2: 99% (19 Nov 2022 10:59) (95% - 100%)    Parameters below as of 19 Nov 2022 10:59  Patient On (Oxygen Delivery Method): room air      I&O's Summary      PHYSICAL EXAM:  General: No acute distress  HEENT: EOMI  Neck:  No JVD  Lungs: Clear to auscultation bilaterally; No rales or wheezing  Heart: Regular rate and rhythm; No murmurs, rubs, or gallops  Abdomen: soft, non tender, non distended   Extremities: warm, no edema   Nervous system:  Alert & Oriented X3  Psychiatric: Normal affect  Skin: No rashes or lesions    LABS:  11-18    127<L>  |  92<L>  |  8.7  ----------------------------<  199<H>  3.9   |  23.0  |  0.66    Ca    8.8      18 Nov 2022 15:58    TPro  6.3<L>  /  Alb  3.7  /  TBili  0.9  /  DBili  x   /  AST  52<H>  /  ALT  18  /  AlkPhos  77  11-17    Creatinine Trend: 0.66<--, 0.68<--, 0.74<--, 0.89<--                        13.9   9.89  )-----------( 149      ( 18 Nov 2022 04:00 )             40.3     PT/INR - ( 17 Nov 2022 23:21 )   PT: 12.4 sec;   INR: 1.07 ratio         PTT - ( 17 Nov 2022 23:21 )  PTT:28.8 sec    Lipid Panel:   Cardiac Enzymes: CARDIAC MARKERS ( 18 Nov 2022 04:00 )  x     / <0.01 ng/mL / x     / x     / x      CARDIAC MARKERS ( 17 Nov 2022 23:21 )  x     / <0.01 ng/mL / x     / x     / x                RADIOLOGY:    ECG: Sinus tachycardia     TELEMETRY: SR    Persantine nuclear stress test on October 26, 2020:  Normal perfusion, no evidence of ischemia or infarction, normal LV function, EF 71% equivocal EKG change for ischemia.  Echocardiogram on 4/6/22:  Normal LV function, EF 55-60%, diastolic dysfunction, mild MR, trace IL, mild TR.  Carotid duplex scan on April 6, 2021:  Normal study with mild intimal-medial thickening and no stenosis bilaterally.    CATHETERIZATION:

## 2022-11-19 NOTE — ED CDU PROVIDER DISPOSITION NOTE - NSFOLLOWUPINSTRUCTIONS_ED_ALL_ED_FT
- Return to the ED for any new or worsening symptoms.   - Begin taking Metoprolol 50mg daily  - Follow-up with your Cardiologist in office in 2-4 weeks    Syncope    Syncope is when you temporarily lose consciousness, also called fainting or passing out. It is caused by a sudden decrease in blood flow to the brain. Even though most causes of syncope are not dangerous, syncope can possibly be a sign of a serious medical problem. Signs that you may be about to faint include feeling dizzy, lightheaded, nausea, visual changes, or cold/clammy skin. Do not drive, operate heavy machinery, or play sports until your health care provider says it is okay.    SEEK IMMEDIATE MEDICAL CARE IF YOU HAVE ANY OF THE FOLLOWING SYMPTOMS: severe headache, pain in your chest/abdomen/back, bleeding from your mouth or rectum, palpitations, shortness of breath, pain with breathing, seizure, confusion, or trouble walking.

## 2022-11-19 NOTE — ED CDU PROVIDER DISPOSITION NOTE - ATTENDING CONTRIBUTION TO CARE
I agree with the PA's note and was available for any issues/concerns. I was directly involved in patient care. My brief overall assessment is as follows:    patient feels comfortable with discharge and medical plan; PMD or clinic follow up recommended for reassessment. Patient is aware of signs/symptoms to return to the emergency department.

## 2022-11-19 NOTE — ED CDU PROVIDER SUBSEQUENT DAY NOTE - NS ED MD PROGRESS NOTE PROVIDER NAME FT
Telephone call to pt, informed of positive COVID results and relayed below message regarding monoclonal antibodies. Pt reports he will  information and contact clinic if he decides he would like IV infusion. Info printed and placed at .    MELISA Kramer

## 2022-11-19 NOTE — ED CDU PROVIDER DISPOSITION NOTE - PATIENT PORTAL LINK FT
You can access the FollowMyHealth Patient Portal offered by [facility] by registering at the following website: [email or web address]. By joining [facility]’s FollowMyHealth portal, you will also be able to view your health information using other applications (apps) compatible with our system.

## 2022-11-19 NOTE — ED CDU PROVIDER SUBSEQUENT DAY NOTE - HISTORY
Pt resting comfortably at time of re-assessment. Denies any acute complaints, states feeling better. Tolerating PO well. No events overnight. Pending cardiology consult. Will continue to monitor.

## 2023-06-08 NOTE — ED CDU PROVIDER INITIAL DAY NOTE - CARDIOVASCULAR NEGATIVE STATEMENT, MLM
Pt tolerated today's venofer without adverse reaction noted  Peripheral iv discontinued jelco intact  Reviewed upcoming appt  Defers avs  Discharged ambulatory 
no chest pain and no edema.

## 2023-11-28 NOTE — ED ADULT NURSE REASSESSMENT NOTE - NS ED NURSE REASSESS COMMENT FT1
LOV;03/09/23    RTC;2months    FU;12/02/23
Pt discharged to home. VSS afebrile. SL removed. Discharge instructions provided.
pt lying in stretcher, resting comfortably. pt denies any complaints of pain/discomfort at this time. pt remains on CM in NSR. pt awaiting cardiologist consult.
pt resting comfortably in stretcher. pt in NAD. pt remains on CM in NSR.
Assumed care of the patient @0730. Pt A&Ox4, VSS afebrile. Pt denies any c/o pain at this time. Awaiting cardio consult. Patient in understanding of plan of care. Patient with no further questions for the RN. Resting in comfort. Call bell within reach and encouraged to use when assistance needed. Will continue to monitor.
Pt received from CARL chaidez and assumed care @ 0730 hrs.   Patient found in a semi fowlers position Patient is A&Ox4  denies any pain or discomfort/  pt came to the ED s/p syncopal episode.  pt reports pmhx f SVT that he was ablated for and CA Colon that he is treated at [facility].  Bilateral  Lung sounds clear, No SOB, No increased work of breathing NO LOC. PERRLA.  Physical assessment reveals:   IV is in place .  Plan of care explained and reviewed with patient, call bell within reach.
Assumed care of pt at 19:15 as stated in report from CARL York. Charting as noted. Patient A&O x4, denies pain/discomfort, denies CP/SOB. Updated on the plan of care. Call bell within reach, bed locked in lowest position. IV site flushed w/ NS. No redness, swelling or pain noted to site. No signs of acute distress noted, safety maintained. Pt remains on CM in NSR/sinus tachycardia.

## 2025-07-17 NOTE — ED CDU PROVIDER INITIAL DAY NOTE - CROS ED GI ALL NEG
HPI   Chief Complaint   Patient presents with    Migraine     I got a migraine, my stomach got upset and I threw up.       Patient is a 35-year-old female with history of PTSD, COPD, hyperlipidemia, OCD, migraines who presents for headache.  Patient states she has been having headaches for several months now.  States she did have an episode about 5 years ago when she had a long strain of headaches, have those resolved.  Has only had intermittent ones between now and then.  States her headache started around 11 PM, gradual onset.  States he had some nausea with her symptoms as well.  No fevers or chills.  States this feels similar to her other headaches.  Has not seen a neurologist yet.              Patient History   Medical History[1]  Surgical History[2]  Family History[3]  Social History[4]    Physical Exam   ED Triage Vitals [07/17/25 0013]   Temperature Heart Rate Respirations BP   36.5 °C (97.7 °F) (!) 105 17 117/65      Pulse Ox Temp Source Heart Rate Source Patient Position   96 % Temporal Monitor Sitting      BP Location FiO2 (%)     Right arm --       Physical Exam  Constitutional:       General: She is not in acute distress.  HENT:      Head: Normocephalic.     Eyes:      Extraocular Movements: Extraocular movements intact.      Conjunctiva/sclera: Conjunctivae normal.      Pupils: Pupils are equal, round, and reactive to light.       Cardiovascular:      Rate and Rhythm: Normal rate and regular rhythm.      Pulses: Normal pulses.      Heart sounds: Normal heart sounds.   Pulmonary:      Effort: Pulmonary effort is normal.      Breath sounds: Normal breath sounds.   Abdominal:      General: There is no distension.      Palpations: Abdomen is soft. There is no mass.      Tenderness: There is no abdominal tenderness. There is no guarding.     Musculoskeletal:         General: No deformity.      Cervical back: Normal range of motion and neck supple.      Right lower leg: No edema.      Left lower leg: No  edema.     Skin:     General: Skin is warm and dry.      Findings: No lesion or rash.     Neurological:      General: No focal deficit present.      Mental Status: She is alert and oriented to person, place, and time. Mental status is at baseline.      Cranial Nerves: No cranial nerve deficit.      Sensory: No sensory deficit.      Motor: No weakness.     Psychiatric:         Mood and Affect: Mood normal.           ED Course & MDM   Diagnoses as of 07/17/25 0401   Migraine without status migrainosus, not intractable, unspecified migraine type   Acute cystitis with hematuria                 No data recorded                                 Medical Decision Making  EKG on my interpretation: Rate 89, rhythm regular, axis normal, , QRS 82, QTc 479, T waves: Mild inversion in aVL, ST segments: No elevations or depressions, interpretation: Normal sinus rhythm, no STEMI    Patient is a 35-year-old female with above-stated past medical history presents for headache.  Patient has full range of motion of her neck, no fevers or chills, have low suspicion for meningitis given the length of her symptoms.  Patient's CMP shows a mild POWER, IV fluids were given.  ECG is not consistent with intrauterine or ectopic pregnancy.  CBC shows a leukocytosis to 19.8, the patient does appear to have a history of elevated white blood cell counts.  No anemia.  Patient's urinalysis shows signs of a possible urinary tract infection.  Given the patient's history of not feeling well, I believe the risks to treating while the culture is running or low given the benefits.  Patient was given a dose of Bactrim and a prescription for the same.  Chest x-ray on my review did not show signs of pneumonia, pneumothorax this was confirmed by Radiology.  CT head was negative for intracranial bleed and I have low suspicion for subarachnoid hemorrhage or CVA.  On reevaluation, patient's headache is resolved.  She is sleeping comfortably.  I advised her to  follow closely with neurology.  She was given return precautions.  She was discharged home in stable condition.    Disclaimer: This note was dictated using speech recognition software. Minor errors in transcription may be present. Please call if questions.     Regis Rocha MD  [facility]  Contact on [employer]          Problems Addressed:  Acute cystitis with hematuria: acute illness or injury  Migraine without status migrainosus, not intractable, unspecified migraine type: acute illness or injury    Amount and/or Complexity of Data Reviewed  Labs: ordered.  Radiology: ordered and independent interpretation performed.  ECG/medicine tests: ordered and independent interpretation performed.        Procedure  Procedures         [1]   Past Medical History:  Diagnosis Date    Allergic     Anxiety     Asthma     COPD (chronic obstructive pulmonary disease) ([facility])     Delivery normal 10/09/2015    Depression     Elevated AFP 2020    Group B Streptococcus carrier, antepartum ([facility]) 2020    Headache     History of postpartum depression, currently pregnant in third trimester ([facility]) 2020    Pregnancy pruritus, third trimester ([facility]) 2020     premature rupture of membranes (PPROM) with unknown onset of labor ([facility]) 2020    TB lung, latent 2020    Tuberculosis 2018 latent    Tuberculosis affecting pregnancy in third trimester ([facility]) 2020   [2]   Past Surgical History:  Procedure Laterality Date    OTHER SURGICAL HISTORY  2020    Foot surgery    OTHER SURGICAL HISTORY  2020    Holly tooth extraction    OTHER SURGICAL HISTORY  10/26/2020    Bilateral salpingectomy    TUBAL LIGATION      WISDOM TOOTH EXTRACTION     [3]   Family History  Problem Relation Name Age of Onset    Alcohol abuse Mother Kenneth Geiger     Depression Mother Kenneth Geiger     Drug abuse Mother Kenneth Kasperner     Mental illness Mother  Kenneth Geiger     Arthritis Maternal Grandmother Roxy Tristen     Diabetes Maternal Grandmother Roxy Ramon     Depression Mother's Sister Beth     Depression Brother Morris so ewww     Drug abuse Brother Morris so eww     Drug abuse Mother's Sister Beth H     Drug abuse Mother's Brother Neno Nielsen     Mental illness Brother Morris so ewwww     Mental illness Mother's Sister Beth Nielsen     Mental illness Mother's Brother Brigette Heljean     Diabetes Maternal Grandmother Roxy Ramon    [4]   Social History  Tobacco Use    Smoking status: Every Day     Current packs/day: 1.00     Average packs/day: 1 pack/day for 15.0 years (15.0 ttl pk-yrs)     Types: Cigarettes    Smokeless tobacco: Never   Vaping Use    Vaping status: Never Used   Substance Use Topics    Alcohol use: Yes     Alcohol/week: 1.0 standard drink of alcohol     Types: 1 Cans of beer per week    Drug use: Never        Regis Rocha MD  07/17/25 0407     negative...